# Patient Record
Sex: MALE | Race: OTHER | Employment: PART TIME | URBAN - METROPOLITAN AREA
[De-identification: names, ages, dates, MRNs, and addresses within clinical notes are randomized per-mention and may not be internally consistent; named-entity substitution may affect disease eponyms.]

---

## 2022-12-29 ENCOUNTER — APPOINTMENT (OUTPATIENT)
Dept: GENERAL RADIOLOGY | Age: 27
End: 2022-12-29

## 2022-12-29 ENCOUNTER — HOSPITAL ENCOUNTER (EMERGENCY)
Age: 27
Discharge: HOME | End: 2022-12-29

## 2022-12-29 VITALS
RESPIRATION RATE: 16 BRPM | TEMPERATURE: 98.4 F | OXYGEN SATURATION: 97 % | HEART RATE: 81 BPM | SYSTOLIC BLOOD PRESSURE: 165 MMHG | DIASTOLIC BLOOD PRESSURE: 90 MMHG

## 2022-12-29 DIAGNOSIS — S61.411A LACERATION OF RIGHT HAND WITHOUT FOREIGN BODY, INITIAL ENCOUNTER: Primary | ICD-10-CM

## 2022-12-29 PROCEDURE — 90471 IMMUNIZATION ADMIN: CPT | Performed by: PHYSICIAN ASSISTANT

## 2022-12-29 PROCEDURE — 6370000000 HC RX 637 (ALT 250 FOR IP): Performed by: PHYSICIAN ASSISTANT

## 2022-12-29 PROCEDURE — 99284 EMERGENCY DEPT VISIT MOD MDM: CPT

## 2022-12-29 PROCEDURE — 73130 X-RAY EXAM OF HAND: CPT

## 2022-12-29 PROCEDURE — 6360000002 HC RX W HCPCS: Performed by: PHYSICIAN ASSISTANT

## 2022-12-29 PROCEDURE — 90714 TD VACC NO PRESV 7 YRS+ IM: CPT | Performed by: PHYSICIAN ASSISTANT

## 2022-12-29 PROCEDURE — 12002 RPR S/N/AX/GEN/TRNK2.6-7.5CM: CPT

## 2022-12-29 RX ORDER — ACETAMINOPHEN 500 MG
1000 TABLET ORAL ONCE
Status: COMPLETED | OUTPATIENT
Start: 2022-12-29 | End: 2022-12-29

## 2022-12-29 RX ORDER — ACETAMINOPHEN 325 MG/1
650 TABLET ORAL EVERY 6 HOURS PRN
Qty: 120 TABLET | Refills: 3 | Status: SHIPPED | OUTPATIENT
Start: 2022-12-29

## 2022-12-29 RX ORDER — CEPHALEXIN 500 MG/1
500 CAPSULE ORAL 2 TIMES DAILY
Qty: 14 CAPSULE | Refills: 0 | Status: SHIPPED | OUTPATIENT
Start: 2022-12-29 | End: 2023-01-05

## 2022-12-29 RX ADMIN — ACETAMINOPHEN 1000 MG: 500 TABLET ORAL at 19:56

## 2022-12-29 RX ADMIN — CLOSTRIDIUM TETANI TOXOID ANTIGEN (FORMALDEHYDE INACTIVATED) AND CORYNEBACTERIUM DIPHTHERIAE TOXOID ANTIGEN (FORMALDEHYDE INACTIVATED) 0.5 ML: 5; 2 INJECTION, SUSPENSION INTRAMUSCULAR at 20:05

## 2022-12-29 ASSESSMENT — PAIN SCALES - GENERAL: PAINLEVEL_OUTOF10: 8

## 2022-12-29 NOTE — Clinical Note
Torrey Valencia was seen and treated in our emergency department on 12/29/2022. He may return to work on 12/30/2022. If you have any questions or concerns, please don't hesitate to call.       AQUILINO Paredes

## 2022-12-30 NOTE — DISCHARGE INSTRUCTIONS
Take Tylenol ibuprofen for pain    Start antibiotics today to cover for infection    Follow-up with primary care doctor or here in the emergency department to have sutures removed the next 7 days    Your tetanus was updated today    Palmarejo Tylenol ibuprofeno para el dolor    Comience con antibióticos hoy para cubrir la infección.     Seguimiento con el médico de atención primaria o aquí en el departamento de emergencias para que le quiten las suturas en los próximos 7 días    Irwin tétanos se actualizó [employer]

## 2022-12-30 NOTE — ED NOTES
Discharge instructions given, patient acknowledged understanding, rx given x2, patient ambulated out of ed upon discharge      Reji Roldan RN  12/29/22 2052

## 2022-12-30 NOTE — ED PROVIDER NOTES
[facility]        Pt Name: Torrey Valencia  MRN: [id number]  [place] 1995  Date of evaluation: 12/29/2022  Provider: AQUILINO Paredes  PCP: No primary care provider on file. Note Started: 8:01 PM EST 12/29/22          BAILEY. I have evaluated this patient. My supervising physician was available for consultation. CHIEF COMPLAINT       Chief Complaint   Patient presents with    Hand Injury     Pt states that he works for [address] [place], [place] [address] and he thinks he cut his right hand on sheet metal or glass. Pt states unsure when last tetanus was. HISTORY OF PRESENT ILLNESS   (Location, Timing/Onset, Context/Setting, Quality, Duration, Modifying Factors, Severity, Associated Signs and Symptoms)  Note limiting factors. Chief Complaint: Right hand laceration    Torrey Valencia is a 28 y.o. male who presents to the emergency room due to right hand laceration. Patient states that he was working with some sheet metal when he accidentally cut the fifth digit side of his right hand. Patient does not know when his last tetanus was updated. Patient states that he applied direct pressure to the wound and came to the emergency department he did not clean out the wound prior to arrival.  Patient denies any numbness tingling or loss sensation to this area. Patient is unsure if anything is with inside the wound or not. Nursing Notes were all reviewed and agreed with or any disagreements were addressed in the HPI. REVIEW OF SYSTEMS    (2-9 systems for level 4, 10 or more for level 5)     Review of Systems   Skin:  Positive for wound. Positives and Pertinent negatives as per HPI. Except as noted above in the ROS, all other systems were reviewed and negative. PAST MEDICAL HISTORY   No past medical history on file. SURGICAL HISTORY   No past surgical history on file.       CURRENTMEDICATIONS       Previous Medications    No medications on file         ALLERGIES     Patient has no known allergies. FAMILYHISTORY     No family history on file. SOCIAL HISTORY       Social History     Tobacco Use    Smoking status: Never    Smokeless tobacco: Never   Substance Use Topics    Alcohol use: Never    Drug use: Never       SCREENINGS        Frederic Coma Scale  Eye Opening: Spontaneous  Best Verbal Response: Oriented  Best Motor Response: Obeys commands  Frederic Coma Scale Score: 15                CIWA Assessment  BP: (!) 165/90  Heart Rate: 81             PHYSICAL EXAM    (up to 7 for level 4, 8 or more for level 5)     ED Triage Vitals   BP Temp Temp Source Heart Rate Resp SpO2 Height Weight   12/29/22 1954 12/29/22 1951 12/29/22 1951 12/29/22 1951 12/29/22 1951 12/29/22 1953 -- --   (!) 165/90 98.4 °F (36.9 °C) Oral 81 16 97 %         Physical Exam  Vitals reviewed. Constitutional:       General: He is not in acute distress. Appearance: He is normal weight. He is not ill-appearing. Cardiovascular:      Rate and Rhythm: Normal rate and regular rhythm. Pulses: Normal pulses. Musculoskeletal:      Comments: Patient has full range of motion at the wrist and hand without any difficulty or significant tenderness. Patient's wound is at the ulnar aspect of the right hand but is more proximal and close to the wrist.  He does have full range of motion of the wrist denied difficulty. Patient has gross sensation to light touch throughout bilateral upper extremities. Bilateral radial pulses equal intact 2+. Capillary refill normal    Patient has good  strength bilaterally. There are no signs of foreign bodies within the laceration. Skin:     Findings: Wound present. Comments: Approx. 4cm laceration along the 5th digit side of right hand. Bleeding controlled with direct pressure. Neurological:      Mental Status: He is alert.        DIAGNOSTIC RESULTS   LABS:    Labs Reviewed - No data to display    When ordered only abnormal lab results are displayed. All other labs were within normal range or not returned as of this dictation. EKG: When ordered, EKG's are interpreted by the Emergency Department Physician in the absence of a cardiologist.  Please see their note for interpretation of EKG. RADIOLOGY:   Non-plain film images such as CT, Ultrasound and MRI are read by the radiologist. Plain radiographic images are visualized and preliminarily interpreted by the ED Provider with the below findings:        Interpretation per the Radiologist below, if available at the time of this note:    XR HAND RIGHT (MIN 3 VIEWS)   Final Result   Soft tissue swelling with no evident fracture or metallic foreign body. No results found. No results found. PROCEDURES   Unless otherwise noted below, none     Lac Repair    Date/Time: 12/29/2022 8:37 PM  Performed by: AQUILINO Washington  Authorized by: AQUILINO Washington     Consent:     Consent obtained:  Verbal    Consent given by:  Patient    Risks discussed:  Infection, pain and retained foreign body  Universal protocol:     Patient identity confirmed:  Verbally with patient  Anesthesia:     Anesthesia method:  Local infiltration    Local anesthetic:  Lidocaine 2% WITH epi  Laceration details:     Location: ulnar aspect of right hand.     Length (cm):  4    Depth (mm):  2  Pre-procedure details:     Preparation:  Patient was prepped and draped in usual sterile fashion and imaging obtained to evaluate for foreign bodies  Exploration:     Limited defect created (wound extended): no      Hemostasis achieved with:  Direct pressure    Imaging outcome: foreign body not noted      Wound exploration: wound explored through full range of motion      Contaminated: no    Treatment:     Area cleansed with:  Chlorhexidine    Amount of cleaning:  Standard    Irrigation volume:  160mL    Irrigation method:  Tap    Visualized foreign bodies/material removed: no Debridement:  None    Undermining:  None  Skin repair:     Repair method:  Sutures    Suture size:  4-0    Suture material:  Nylon    Suture technique:  Simple interrupted    Number of sutures:  9  Approximation:     Approximation:  Close  Repair type:     Repair type:  Simple  Post-procedure details:     Dressing:  Antibiotic ointment, non-adherent dressing and bulky dressing    Procedure completion:  Tolerated    CRITICAL CARE TIME       CONSULTS:  None      EMERGENCY DEPARTMENT COURSE and DIFFERENTIAL DIAGNOSIS/MDM:   Vitals:    Vitals:    12/29/22 1951 12/29/22 1953 12/29/22 1954   BP:   (!) 165/90   Pulse: 81     Resp: 16     Temp: 98.4 °F (36.9 °C)     TempSrc: Oral     SpO2:  97%        Patient was given the following medications:  Medications   acetaminophen (TYLENOL) tablet 1,000 mg (1,000 mg Oral Given 12/29/22 1956)   tetanus & diphtheria toxoids (adult) 5-2 LFU injection 0.5 mL (0.5 mLs IntraMUSCular Given 12/29/22 2005)         Is this patient to be included in the SEP-1 Core Measure due to severe sepsis or septic shock? No   Exclusion criteria - the patient is NOT to be included for SEP-1 Core Measure due to: Infection is not suspected    58-year-old male presents emergency department due to laceration to the ulnar aspect of the right hand. There is approximately 4 cm laceration. Patient has full range of motion of the right wrist without any difficulty. Patient has gross sensation to light touch bilaterally. Normal capillary refill and normal bilateral radial pulses. X-ray was ordered due to the patient having laceration from being cut by a sheet metal at work. Concern for possible foreign bodies. X-ray did not show any foreign bodies. There is also no osseous abnormalities on the x-ray.     Laceration repair was completed patient tolerated this well    Due to the nature of the injury with sheet-metal patient will be empirically treated with antibiotics and give a prescription for Tylenol for pain and to follow-up with primary care doctor or to return here in the emergency room to have sutures removed in the next 7 days. FINAL IMPRESSION      1. Laceration of right hand without foreign body, initial encounter          DISPOSITION/PLAN   DISPOSITION Decision To Discharge 12/29/2022 08:43:40 PM      PATIENT REFERRED TO:  No follow-up provider specified.     DISCHARGE MEDICATIONS:  New Prescriptions    ACETAMINOPHEN (AMINOFEN) 325 MG TABLET    Take 2 tablets by mouth every 6 hours as needed for Pain    CEPHALEXIN (KEFLEX) 500 MG CAPSULE    Take 1 capsule by mouth 2 times daily for 7 days       DISCONTINUED MEDICATIONS:  Discontinued Medications    No medications on file              (Please note that portions of this note were completed with a voice recognition program.  Efforts were made to edit the dictations but occasionally words are mis-transcribed.)    AQUILINO Acevedo (electronically signed)            Logan Acevedo  12/29/22 2051